# Patient Record
Sex: FEMALE | Race: WHITE | Employment: OTHER | URBAN - METROPOLITAN AREA
[De-identification: names, ages, dates, MRNs, and addresses within clinical notes are randomized per-mention and may not be internally consistent; named-entity substitution may affect disease eponyms.]

---

## 2017-03-22 ENCOUNTER — OFFICE VISIT (OUTPATIENT)
Dept: DERMATOLOGY CLINIC | Facility: CLINIC | Age: 62
End: 2017-03-22

## 2017-03-22 DIAGNOSIS — D23.60 BENIGN NEOPLASM OF SKIN OF UPPER LIMB, INCLUDING SHOULDER, UNSPECIFIED LATERALITY: ICD-10-CM

## 2017-03-22 DIAGNOSIS — D23.4 BENIGN NEOPLASM OF SCALP AND SKIN OF NECK: ICD-10-CM

## 2017-03-22 DIAGNOSIS — D23.30 BENIGN NEOPLASM OF SKIN OF FACE: ICD-10-CM

## 2017-03-22 DIAGNOSIS — L82.1 SEBORRHEIC KERATOSES: ICD-10-CM

## 2017-03-22 DIAGNOSIS — L30.8 PSORIASIFORM DERMATITIS: Primary | ICD-10-CM

## 2017-03-22 DIAGNOSIS — D23.5 BENIGN NEOPLASM OF SKIN OF TRUNK, EXCEPT SCROTUM: ICD-10-CM

## 2017-03-22 DIAGNOSIS — L72.0 EPIDERMAL CYST: ICD-10-CM

## 2017-03-22 DIAGNOSIS — D23.70 BENIGN NEOPLASM OF SKIN OF LOWER LIMB, INCLUDING HIP, UNSPECIFIED LATERALITY: ICD-10-CM

## 2017-03-22 PROCEDURE — 99213 OFFICE O/P EST LOW 20 MIN: CPT | Performed by: DERMATOLOGY

## 2017-03-22 PROCEDURE — G0463 HOSPITAL OUTPT CLINIC VISIT: HCPCS | Performed by: DERMATOLOGY

## 2017-03-22 RX ORDER — PENTOXIFYLLINE 400 MG/1
400 TABLET, EXTENDED RELEASE ORAL 2 TIMES DAILY
Qty: 180 TABLET | Refills: 3 | Status: SHIPPED | OUTPATIENT
Start: 2017-03-22 | End: 2017-12-11

## 2017-03-22 RX ORDER — CLOTRIMAZOLE AND BETAMETHASONE DIPROPIONATE 10; .64 MG/G; MG/G
CREAM TOPICAL
Qty: 45 G | Refills: 2 | Status: SHIPPED | OUTPATIENT
Start: 2017-03-22 | End: 2021-10-28 | Stop reason: ALTCHOICE

## 2017-04-09 NOTE — PROGRESS NOTES
Lexus Hale is a 64year old female. HPI:     CC:  Patient presents with:  Derm Problem: Pt here for 4 mo f/u. L foot much improved. Pt thinks she still has stitch from bx. Other concerns:  rough patch at L forearm and redness at L axilla x 2 months. Onset   • Heart Disorder Father 36     CAD, MI   • Cancer Mother      LUNG   • Diabetes Paternal Grandmother    • Heart Disorder Paternal Grandfather    • Hypertension Maternal Grandmother    • Diabetes Maternal Grandmother    • Heart Disorder Maternal Gra Comment:Other reaction(s): Other (See Comments)             Rectal bleeding             Other reaction(s):  Other (See Comments)             Rectal bleeding  Biaxin [Clarithromy*        Comment:Blood in stools  Xarelto [Rivaroxaba*    Rash    Comment for W/C for shoulder injury       Social History Main Topics   Smoking status: Former Smoker  1.00 Packs/Day  For 20.00 Years     Types: Cigarettes    Quit date: 01/01/2002    Smokeless tobacco: Former User    Comment: quit in 2003    Alcohol Use: Yes  0.0 allergies reviewed as noted. Physical Examination:     Well-developed well-nourished patient alert oriented in no acute distress.   Exam total-body performed, including scalp, head, neck, face,nails, hair, external eyes, including conjunctival mucosa, for: Telangiectatic patch biopsy, labs unremarkable still consistent with chilblains, possible    Venous stasis changes. Improved with Trental will continue. Await response over the summer. Problems to let us know.   No other underlying factor found to t

## 2017-04-12 PROBLEM — H16.403: Status: ACTIVE | Noted: 2017-04-12

## 2017-04-12 PROBLEM — H25.13 NUCLEAR SCLEROSIS OF BOTH EYES: Status: ACTIVE | Noted: 2017-04-12

## 2017-07-27 ENCOUNTER — OFFICE VISIT (OUTPATIENT)
Dept: DERMATOLOGY CLINIC | Facility: CLINIC | Age: 62
End: 2017-07-27

## 2017-07-27 DIAGNOSIS — D23.9 BENIGN NEOPLASM OF SKIN, UNSPECIFIED LOCATION: ICD-10-CM

## 2017-07-27 DIAGNOSIS — L72.0 EPIDERMAL CYST: Primary | ICD-10-CM

## 2017-07-27 DIAGNOSIS — L30.8 PSORIASIFORM DERMATITIS: ICD-10-CM

## 2017-07-27 PROCEDURE — G0463 HOSPITAL OUTPT CLINIC VISIT: HCPCS | Performed by: DERMATOLOGY

## 2017-07-27 PROCEDURE — 99213 OFFICE O/P EST LOW 20 MIN: CPT | Performed by: DERMATOLOGY

## 2017-08-07 NOTE — PROGRESS NOTES
Lucila Warner is a 58year old female. HPI:     CC:  Patient presents with:  Lesion: Established pt (LOV 3/2017) pt presents with raised, painful lesion on left inner thigh x3 days. Painful with friction from clothing.  Has been appying Cleocin-t with no i ARTHROSCOP,PART ACROMIOPLAS      Comment: Performed by Sheela Mayo at [address]  8/12/2010: UPPER GI ENDOSCOPY,BIOPSY      Comment: Performed by KEKE SALAS at [address]   Family History   Pr Disp:  Rfl:    Flaxseed, Linseed, (FLAX SEED OIL OR) 1 tsp daily Disp:  Rfl:    MULTIVITAMINS OR TABS 1 TABLET TWICE DAILY Disp:  Rfl:    VITAMIN E 1 GELCAPSULE DAILY Disp:  Rfl:    OMEGA 3 OR 1 GELCAPSULE TWICE DAILY Disp:  Rfl:    VITAMIN C OR 1 TABLET T [facility]  5/15/09: Claudette cMcall      Comment: Performed by Jose Sifuentes at [address]  8/12/2010: UPPER GI ENDOSCOPY,BIOPSY      Comment: Performed by Cele Giron at [address] noted.      ROS:  Denies any other systemic complaints. No new or changeing lesions other than noted above. No fevers, chills, night sweats, unusual sun sensitivity. No other skin complaints. History, medications, allergies reviewed as noted. reviewed. See  Medications in grid. Instructions reviewed at length. Please refer to map for specific lesions. See additional diagnoses. Pros cons of various therapies, risks benefits discussed. Pathophysiology discussed with patient.   Therapeutic op

## 2017-08-15 PROCEDURE — 88305 TISSUE EXAM BY PATHOLOGIST: CPT | Performed by: INTERNAL MEDICINE

## 2017-08-16 PROBLEM — H43.811 VITREOUS DETACHMENT, RIGHT: Status: ACTIVE | Noted: 2017-08-16

## 2017-08-16 PROBLEM — H25.813 COMBINED FORMS OF AGE-RELATED CATARACT OF BOTH EYES: Status: ACTIVE | Noted: 2017-08-16

## 2017-10-09 PROCEDURE — 88175 CYTOPATH C/V AUTO FLUID REDO: CPT | Performed by: OBSTETRICS & GYNECOLOGY

## 2017-12-12 RX ORDER — PENTOXIFYLLINE 400 MG/1
TABLET, EXTENDED RELEASE ORAL
Qty: 180 TABLET | Refills: 3 | Status: SHIPPED | OUTPATIENT
Start: 2017-12-12 | End: 2018-08-22

## 2018-10-16 PROCEDURE — 88175 CYTOPATH C/V AUTO FLUID REDO: CPT | Performed by: OBSTETRICS & GYNECOLOGY

## 2018-11-08 PROCEDURE — 88305 TISSUE EXAM BY PATHOLOGIST: CPT | Performed by: INTERNAL MEDICINE

## 2019-01-19 PROBLEM — E88.81 INSULIN RESISTANCE: Status: ACTIVE | Noted: 2019-01-19

## 2019-01-19 PROBLEM — I65.22 STENOSIS OF LEFT CAROTID ARTERY: Status: ACTIVE | Noted: 2019-01-19

## 2019-01-19 PROBLEM — E66.9 OBESITY (BMI 30-39.9): Status: ACTIVE | Noted: 2019-01-19

## 2019-01-25 PROCEDURE — 83525 ASSAY OF INSULIN: CPT | Performed by: INTERNAL MEDICINE

## 2019-06-24 ENCOUNTER — APPOINTMENT (RX ONLY)
Dept: URBAN - METROPOLITAN AREA CLINIC 16 | Facility: CLINIC | Age: 64
Setting detail: DERMATOLOGY
End: 2019-06-24

## 2019-06-24 DIAGNOSIS — D69.2 OTHER NONTHROMBOCYTOPENIC PURPURA: ICD-10-CM

## 2019-06-24 DIAGNOSIS — L29.89 OTHER PRURITUS: ICD-10-CM

## 2019-06-24 DIAGNOSIS — L29.8 OTHER PRURITUS: ICD-10-CM

## 2019-06-24 PROBLEM — I10 ESSENTIAL (PRIMARY) HYPERTENSION: Status: ACTIVE | Noted: 2019-06-24

## 2019-06-24 PROBLEM — M06.9 RHEUMATOID ARTHRITIS, UNSPECIFIED: Status: ACTIVE | Noted: 2019-06-24

## 2019-06-24 PROBLEM — K21.9 GASTRO-ESOPHAGEAL REFLUX DISEASE WITHOUT ESOPHAGITIS: Status: ACTIVE | Noted: 2019-06-24

## 2019-06-24 PROBLEM — R23.3 SPONTANEOUS ECCHYMOSES: Status: ACTIVE | Noted: 2019-06-24

## 2019-06-24 PROBLEM — F32.9 MAJOR DEPRESSIVE DISORDER, SINGLE EPISODE, UNSPECIFIED: Status: ACTIVE | Noted: 2019-06-24

## 2019-06-24 PROBLEM — F41.9 ANXIETY DISORDER, UNSPECIFIED: Status: ACTIVE | Noted: 2019-06-24

## 2019-06-24 PROBLEM — M12.9 ARTHROPATHY, UNSPECIFIED: Status: ACTIVE | Noted: 2019-06-24

## 2019-06-24 PROCEDURE — ? TREATMENT REGIMEN

## 2019-06-24 PROCEDURE — ? PRESCRIPTION

## 2019-06-24 PROCEDURE — 99202 OFFICE O/P NEW SF 15 MIN: CPT

## 2019-06-24 PROCEDURE — ? DIAGNOSIS COMMENT

## 2019-06-24 PROCEDURE — ? COUNSELING

## 2019-06-24 PROCEDURE — ? ORDER TESTS

## 2019-06-24 RX ORDER — ACETYLCYSTEINE 600 MG
CAPSULE ORAL
Qty: 60 | Refills: 2 | Status: ERX | COMMUNITY
Start: 2019-06-24

## 2019-06-24 RX ADMIN — Medication: at 00:00

## 2019-06-24 ASSESSMENT — LOCATION SIMPLE DESCRIPTION DERM
LOCATION SIMPLE: RIGHT SHOULDER
LOCATION SIMPLE: RIGHT UPPER BACK
LOCATION SIMPLE: RIGHT FOREARM
LOCATION SIMPLE: RIGHT PRETIBIAL REGION
LOCATION SIMPLE: LEFT UPPER BACK
LOCATION SIMPLE: LEFT SHOULDER
LOCATION SIMPLE: LEFT FOREARM
LOCATION SIMPLE: LEFT PRETIBIAL REGION

## 2019-06-24 ASSESSMENT — LOCATION DETAILED DESCRIPTION DERM
LOCATION DETAILED: RIGHT DISTAL DORSAL FOREARM
LOCATION DETAILED: RIGHT SUPERIOR UPPER BACK
LOCATION DETAILED: LEFT PROXIMAL PRETIBIAL REGION
LOCATION DETAILED: LEFT PROXIMAL DORSAL FOREARM
LOCATION DETAILED: LEFT SUPERIOR UPPER BACK
LOCATION DETAILED: RIGHT PROXIMAL PRETIBIAL REGION
LOCATION DETAILED: RIGHT PROXIMAL DORSAL FOREARM
LOCATION DETAILED: LEFT POSTERIOR SHOULDER
LOCATION DETAILED: LEFT DISTAL PRETIBIAL REGION
LOCATION DETAILED: RIGHT POSTERIOR SHOULDER

## 2019-06-24 ASSESSMENT — LOCATION ZONE DERM
LOCATION ZONE: TRUNK
LOCATION ZONE: ARM
LOCATION ZONE: LEG

## 2019-06-24 NOTE — HPI: SKIN LESIONS
How Severe Is Your Skin Lesion?: mild
Have Your Skin Lesions Been Treated?: not been treated
Is This A New Presentation, Or A Follow-Up?: Skin Lesions
Additional History: Patient has spots all over arms and legs that is very itchy and creates redness when she itches that doesn’t go away x1 year.

## 2019-06-24 NOTE — PROCEDURE: TREATMENT REGIMEN
Initiate Treatment: Start CeraVe anti-itch cream as needed throughout the day. Keep it in the fridge to keep cool.\\nStart N-acetylcysteine 600mg twice daily
Detail Level: Simple
Initiate Treatment: Start arnica cream topically daily (Arnicare or DermMend) \\nStart Vitamin C 500mg twice daily and rutoside 50mg twice daily

## 2019-07-24 ENCOUNTER — APPOINTMENT (RX ONLY)
Dept: URBAN - METROPOLITAN AREA CLINIC 16 | Facility: CLINIC | Age: 64
Setting detail: DERMATOLOGY
End: 2019-07-24

## 2019-07-24 DIAGNOSIS — L29.89 OTHER PRURITUS: ICD-10-CM | Status: IMPROVED

## 2019-07-24 DIAGNOSIS — D22 MELANOCYTIC NEVI: ICD-10-CM

## 2019-07-24 DIAGNOSIS — L29.8 OTHER PRURITUS: ICD-10-CM | Status: IMPROVED

## 2019-07-24 DIAGNOSIS — D69.2 OTHER NONTHROMBOCYTOPENIC PURPURA: ICD-10-CM

## 2019-07-24 PROBLEM — D22.5 MELANOCYTIC NEVI OF TRUNK: Status: ACTIVE | Noted: 2019-07-24

## 2019-07-24 PROCEDURE — ? COUNSELING

## 2019-07-24 PROCEDURE — 99213 OFFICE O/P EST LOW 20 MIN: CPT

## 2019-07-24 PROCEDURE — ? ORDER TESTS

## 2019-07-24 PROCEDURE — ? DIAGNOSIS COMMENT

## 2019-07-24 PROCEDURE — ? TREATMENT REGIMEN

## 2019-07-24 ASSESSMENT — LOCATION ZONE DERM
LOCATION ZONE: TRUNK
LOCATION ZONE: LEG
LOCATION ZONE: ARM

## 2019-07-24 ASSESSMENT — LOCATION SIMPLE DESCRIPTION DERM
LOCATION SIMPLE: RIGHT UPPER BACK
LOCATION SIMPLE: LEFT FOREARM
LOCATION SIMPLE: LEFT SHOULDER
LOCATION SIMPLE: LEFT PRETIBIAL REGION
LOCATION SIMPLE: LEFT UPPER BACK
LOCATION SIMPLE: RIGHT FOREARM
LOCATION SIMPLE: RIGHT SHOULDER
LOCATION SIMPLE: ABDOMEN
LOCATION SIMPLE: RIGHT PRETIBIAL REGION

## 2019-07-24 ASSESSMENT — LOCATION DETAILED DESCRIPTION DERM
LOCATION DETAILED: LEFT PROXIMAL DORSAL FOREARM
LOCATION DETAILED: RIGHT PROXIMAL PRETIBIAL REGION
LOCATION DETAILED: RIGHT SUPERIOR UPPER BACK
LOCATION DETAILED: RIGHT POSTERIOR SHOULDER
LOCATION DETAILED: RIGHT DISTAL DORSAL FOREARM
LOCATION DETAILED: LEFT DISTAL PRETIBIAL REGION
LOCATION DETAILED: LEFT SUPERIOR UPPER BACK
LOCATION DETAILED: RIGHT PROXIMAL DORSAL FOREARM
LOCATION DETAILED: LEFT POSTERIOR SHOULDER
LOCATION DETAILED: SUBXIPHOID
LOCATION DETAILED: LEFT PROXIMAL PRETIBIAL REGION

## 2019-07-24 NOTE — PROCEDURE: TREATMENT REGIMEN
Continue Regimen: Continue arnica cream topically daily (Arnicare or DermMend)
Initiate Treatment: Start Vitamin C 500mg twice daily and rutoside 50mg twice daily
Detail Level: Simple
Initiate Treatment: Start CeraVe anti-itch cream as needed throughout the day. Keep it in the fridge to keep cool.
Continue Regimen: Continue N-acetylcysteine 600mg twice daily

## 2019-07-24 NOTE — PROCEDURE: MIPS QUALITY
Detail Level: Detailed
Quality 110: Preventive Care And Screening: Influenza Immunization: Influenza Immunization Administered during Influenza season
Quality 111:Pneumonia Vaccination Status For Older Adults: Pneumococcal Vaccination Administered
Quality 47: Advance Care Plan: Advance Care Planning discussed and documented; advance care plan or surrogate decision maker documented in the medical record.
Quality 226: Preventive Care And Screening: Tobacco Use: Screening And Cessation Intervention: Patient screened for tobacco use and is an ex/non-smoker

## 2019-09-19 PROCEDURE — 83525 ASSAY OF INSULIN: CPT | Performed by: INTERNAL MEDICINE

## 2019-09-19 PROCEDURE — 82397 CHEMILUMINESCENT ASSAY: CPT | Performed by: INTERNAL MEDICINE

## 2019-09-19 PROCEDURE — 36415 COLL VENOUS BLD VENIPUNCTURE: CPT | Performed by: INTERNAL MEDICINE

## 2019-10-24 PROBLEM — E66.9 OBESITY (BMI 30-39.9): Status: RESOLVED | Noted: 2019-01-19 | Resolved: 2019-10-24

## 2019-10-24 PROBLEM — Z80.52 FAMILY HISTORY OF BLADDER CANCER: Status: ACTIVE | Noted: 2018-08-22

## 2020-06-23 ENCOUNTER — APPOINTMENT (RX ONLY)
Dept: URBAN - METROPOLITAN AREA CLINIC 16 | Facility: CLINIC | Age: 65
Setting detail: DERMATOLOGY
End: 2020-06-23

## 2020-06-23 DIAGNOSIS — D69.2 OTHER NONTHROMBOCYTOPENIC PURPURA: ICD-10-CM

## 2020-06-23 PROCEDURE — 99213 OFFICE O/P EST LOW 20 MIN: CPT

## 2020-06-23 PROCEDURE — ? COUNSELING

## 2020-06-23 PROCEDURE — ? TREATMENT REGIMEN

## 2020-06-23 PROCEDURE — ? DIAGNOSIS COMMENT

## 2020-06-23 PROCEDURE — ? PHOTO-DOCUMENTATION

## 2020-06-23 PROCEDURE — ? ORDER TESTS

## 2020-06-23 ASSESSMENT — LOCATION ZONE DERM
LOCATION ZONE: ARM
LOCATION ZONE: TRUNK
LOCATION ZONE: LEG

## 2020-06-23 ASSESSMENT — LOCATION DETAILED DESCRIPTION DERM
LOCATION DETAILED: LEFT PROXIMAL DORSAL FOREARM
LOCATION DETAILED: LEFT PROXIMAL PRETIBIAL REGION
LOCATION DETAILED: RIGHT PROXIMAL DORSAL FOREARM
LOCATION DETAILED: UPPER STERNUM
LOCATION DETAILED: RIGHT PROXIMAL PRETIBIAL REGION

## 2020-06-23 ASSESSMENT — LOCATION SIMPLE DESCRIPTION DERM
LOCATION SIMPLE: RIGHT PRETIBIAL REGION
LOCATION SIMPLE: RIGHT FOREARM
LOCATION SIMPLE: CHEST
LOCATION SIMPLE: LEFT PRETIBIAL REGION
LOCATION SIMPLE: LEFT FOREARM

## 2020-06-23 NOTE — PROCEDURE: DIAGNOSIS COMMENT
Detail Level: Simple
Comment: Primarily macular ecchymoses (few intermediate purpura), similar to visit last year. She never had labs done so plan to check now as discussed below. She did not feel that topical arnica cream was very helpful, stopped use of vitamin C but is interested in restarting. Has been avoiding further NSAID use. \\n\\nPatient likely susceptible due to minor trauma, age and sun damaged skin with history of prior extensive sun exposure. She denies development of painful or raised purpura or purpura in areas where trauma has not occurred. \\n\\nNo signs of scurvy, no history of Anna-Danlos or platelet function defect (eg Von Willebrand disease). Recommend checking labs as below to investigate for underlying thrombocytopenia, hepatic insufficiency, prolonged INR (vitamin K deficiency), amyloidosis.

## 2020-06-23 NOTE — PROCEDURE: TREATMENT REGIMEN
Initiate Treatment: Have labs checked \\nRestart Vitamin C 500mg twice daily and rutoside 50mg twice daily\\nCan consider restarting arnica cream topically daily (Arnicare or DermMend)
Detail Level: Simple
Discontinue Regimen: Remain off of NSAIDs

## 2020-07-23 ENCOUNTER — APPOINTMENT (RX ONLY)
Dept: URBAN - METROPOLITAN AREA CLINIC 16 | Facility: CLINIC | Age: 65
Setting detail: DERMATOLOGY
End: 2020-07-23

## 2020-07-23 DIAGNOSIS — D69.2 OTHER NONTHROMBOCYTOPENIC PURPURA: ICD-10-CM

## 2020-07-23 PROCEDURE — 99213 OFFICE O/P EST LOW 20 MIN: CPT

## 2020-07-23 PROCEDURE — ? ORDER TESTS

## 2020-07-23 PROCEDURE — ? TREATMENT REGIMEN

## 2020-07-23 PROCEDURE — ? COUNSELING

## 2020-07-23 PROCEDURE — ? DIAGNOSIS COMMENT

## 2020-07-23 ASSESSMENT — LOCATION ZONE DERM
LOCATION ZONE: LEG
LOCATION ZONE: FACE
LOCATION ZONE: ARM

## 2020-07-23 ASSESSMENT — LOCATION SIMPLE DESCRIPTION DERM
LOCATION SIMPLE: RIGHT FOREARM
LOCATION SIMPLE: LEFT CHEEK
LOCATION SIMPLE: RIGHT PRETIBIAL REGION
LOCATION SIMPLE: LEFT FOREARM
LOCATION SIMPLE: LEFT PRETIBIAL REGION

## 2020-07-23 ASSESSMENT — LOCATION DETAILED DESCRIPTION DERM
LOCATION DETAILED: LEFT PROXIMAL PRETIBIAL REGION
LOCATION DETAILED: LEFT CENTRAL MALAR CHEEK
LOCATION DETAILED: RIGHT PROXIMAL DORSAL FOREARM
LOCATION DETAILED: RIGHT PROXIMAL PRETIBIAL REGION
LOCATION DETAILED: LEFT PROXIMAL DORSAL FOREARM

## 2020-07-23 NOTE — PROCEDURE: DIAGNOSIS COMMENT
Detail Level: Simple
Comment: Primarily macular ecchymoses (few intermediate purpura), similar to visit last year. She still did not have labs done from last visit - advised that unable to provide further guidance for treatment recommendations until labs are done - patient states that she will have them done today. Does not appear concerning for vasculitis, biopsy unlikely to be helpful at this time. \\n\\nPatient likely susceptible due to minor trauma, age and sun damaged skin with history of prior extensive sun exposure. She denies development of painful or raised purpura or purpura in areas where trauma has not occurred - although she is not sure how she developed the new purpuric macule on her left cheek today. \\n\\nNo signs of scurvy, no history of Anna-Danlos or platelet function defect (eg Von Willebrand disease). Recommend checking labs as below to investigate for underlying thrombocytopenia, hepatic insufficiency, prolonged INR (vitamin K deficiency), amyloidosis.

## 2020-07-24 ENCOUNTER — APPOINTMENT (RX ONLY)
Dept: URBAN - METROPOLITAN AREA CLINIC 16 | Facility: CLINIC | Age: 65
Setting detail: DERMATOLOGY
End: 2020-07-24

## 2020-07-24 DIAGNOSIS — D69.2 OTHER NONTHROMBOCYTOPENIC PURPURA: ICD-10-CM

## 2020-07-24 PROCEDURE — ? ORDER TESTS

## 2020-07-24 PROCEDURE — ? DIAGNOSIS COMMENT

## 2020-07-24 NOTE — PROCEDURE: DIAGNOSIS COMMENT
Comment: Lab unable to perform Amyloid test so plan to screen for systemic amyloidosis with SPEP/SIFE, UPEP/UIFE and serum free light chains to screen for abnormal or monoclonal light chains.
Detail Level: Simple

## 2020-10-30 DIAGNOSIS — Z11.59 SPECIAL SCREENING EXAMINATION FOR UNSPECIFIED VIRAL DISEASE: Primary | ICD-10-CM

## 2020-11-04 ENCOUNTER — LAB SERVICES (OUTPATIENT)
Dept: LAB | Age: 65
End: 2020-11-04

## 2020-11-04 DIAGNOSIS — Z11.59 SPECIAL SCREENING EXAMINATION FOR UNSPECIFIED VIRAL DISEASE: ICD-10-CM

## 2020-11-05 LAB
SARS-COV-2 RNA RESP QL NAA+PROBE: NOT DETECTED
SERVICE CMNT-IMP: NORMAL
SPECIMEN SOURCE: NORMAL

## 2021-03-20 PROBLEM — E04.2 MULTIPLE THYROID NODULES: Status: ACTIVE | Noted: 2021-03-20

## 2022-06-17 PROCEDURE — 88300 SURGICAL PATH GROSS: CPT | Performed by: NURSE PRACTITIONER

## 2022-06-20 ENCOUNTER — LAB REQUISITION (OUTPATIENT)
Age: 67
End: 2022-06-20
Payer: MEDICARE
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
## 2024-05-30 NOTE — PROCEDURE: DIAGNOSIS COMMENT
Comment: Primarily macular ecchymoses with few intermediate purpura on upper back - likely exacerbated by trauma from scratching underlying pruritus (see below). Patient likely susceptible due to daily NSAID use (for treatment of arthritis) and sun damaged skin. She was previously on aspirin but stopped taking it about a month ago. No other signs of scurvy, no history of Anna-Danlos or platelet function defect (eg Von Willebrand disease). Recommend checking labs as below to investigate for underlying thrombocytopenia, hepatic insufficiency, prolonged INR (vitamin K deficiency), amyloidosis. Otherwise, start therapy as below.
Detail Level: Simple
Comment: Patient with pruritus isolated to extremities and upper back. No underlying primary dermatitis or obvious xerosis. May be drug-induced as patient is on several medications that have been implicated in causing pruritus (oxycodone, ibuprofen, omeprazole, verapamil > fluoxetine, meloxicam > atorvastatin, gabapentin, lisinopril) and patient recently completed a course of terbinafine. Plan to start therapy as below. If not improved at follow-up, consider speaking with PCP to try to discontinue any non-essential medications +/- systemic lab work-up to investigate for any underlying cause.
yes

## (undated) NOTE — MR AVS SNAPSHOT
[facility] - [place] [address] [place] [address] [phone]               Thank you for choosing us for your health care visit with Eli Bhat MD.  We are glad to serve you and happy to provide you with this summary of aspirin 81 MG Tabs   Take 81 mg by mouth daily.            clotrimazole-betamethasone 1-0.05 % Crea   Use bid to affected areas of skin   Commonly known as:  LOTRISONE           FLAX SEED OIL OR   1 tsp daily           FLEXERIL 10 MG Tabs   Generic drug: EAT THESE FOODS MORE OFTEN: EAT THESE FOODS LESS OFTEN:   Make half your plate fruits and vegetables Highly refined, white starches including white bread, rice and pasta   Eat plenty of protein, keep the fat content low Sugars:  sodas and sports drinks, ca